# Patient Record
Sex: FEMALE | Race: BLACK OR AFRICAN AMERICAN | Employment: OTHER | URBAN - METROPOLITAN AREA
[De-identification: names, ages, dates, MRNs, and addresses within clinical notes are randomized per-mention and may not be internally consistent; named-entity substitution may affect disease eponyms.]

---

## 2021-03-09 ENCOUNTER — OFFICE VISIT (OUTPATIENT)
Dept: GASTROENTEROLOGY | Age: 66
End: 2021-03-09
Payer: MEDICARE

## 2021-03-09 VITALS
OXYGEN SATURATION: 98 % | DIASTOLIC BLOOD PRESSURE: 80 MMHG | BODY MASS INDEX: 30.96 KG/M2 | RESPIRATION RATE: 16 BRPM | SYSTOLIC BLOOD PRESSURE: 120 MMHG | HEART RATE: 56 BPM | HEIGHT: 61 IN | TEMPERATURE: 97.4 F | WEIGHT: 164 LBS

## 2021-03-09 DIAGNOSIS — R10.13 EPIGASTRIC PAIN: ICD-10-CM

## 2021-03-09 DIAGNOSIS — K57.92 DIVERTICULITIS: ICD-10-CM

## 2021-03-09 DIAGNOSIS — R10.32 LLQ ABDOMINAL PAIN: ICD-10-CM

## 2021-03-09 DIAGNOSIS — K57.30 DIVERTICULAR DISEASE OF COLON: ICD-10-CM

## 2021-03-09 DIAGNOSIS — Z87.19 HISTORY OF GASTRIC POLYP: Primary | ICD-10-CM

## 2021-03-09 PROBLEM — E55.9 VITAMIN D DEFICIENCY: Status: ACTIVE | Noted: 2021-03-09

## 2021-03-09 PROBLEM — M50.20 HERNIATED DISC, CERVICAL: Status: ACTIVE | Noted: 2021-03-09

## 2021-03-09 PROBLEM — I10 HTN (HYPERTENSION): Status: ACTIVE | Noted: 2021-03-09

## 2021-03-09 PROBLEM — M41.9 SCOLIOSIS: Status: ACTIVE | Noted: 2021-03-09

## 2021-03-09 PROCEDURE — 3017F COLORECTAL CA SCREEN DOC REV: CPT | Performed by: INTERNAL MEDICINE

## 2021-03-09 PROCEDURE — G8754 DIAS BP LESS 90: HCPCS | Performed by: INTERNAL MEDICINE

## 2021-03-09 PROCEDURE — G8427 DOCREV CUR MEDS BY ELIG CLIN: HCPCS | Performed by: INTERNAL MEDICINE

## 2021-03-09 PROCEDURE — 99204 OFFICE O/P NEW MOD 45 MIN: CPT | Performed by: INTERNAL MEDICINE

## 2021-03-09 PROCEDURE — G8510 SCR DEP NEG, NO PLAN REQD: HCPCS | Performed by: INTERNAL MEDICINE

## 2021-03-09 PROCEDURE — G8752 SYS BP LESS 140: HCPCS | Performed by: INTERNAL MEDICINE

## 2021-03-09 PROCEDURE — G8417 CALC BMI ABV UP PARAM F/U: HCPCS | Performed by: INTERNAL MEDICINE

## 2021-03-09 RX ORDER — METRONIDAZOLE 500 MG/1
TABLET ORAL
COMMUNITY
Start: 2021-02-19

## 2021-03-09 RX ORDER — CLINDAMYCIN HYDROCHLORIDE 150 MG/1
CAPSULE ORAL
COMMUNITY
Start: 2021-02-19

## 2021-03-09 RX ORDER — HYDROCHLOROTHIAZIDE 25 MG/1
TABLET ORAL
COMMUNITY
Start: 2021-02-22

## 2021-03-09 RX ORDER — AMLODIPINE BESYLATE 10 MG/1
TABLET ORAL
COMMUNITY
Start: 2021-02-22

## 2021-03-09 RX ORDER — NAPROXEN 375 MG/1
TABLET ORAL
COMMUNITY
Start: 2021-02-22

## 2021-03-09 RX ORDER — PANTOPRAZOLE SODIUM 40 MG/1
TABLET, DELAYED RELEASE ORAL
COMMUNITY
Start: 2021-02-27

## 2021-03-09 NOTE — PROGRESS NOTES
1. Have you been to the ER, urgent care clinic since your last visit? Hospitalized since your last visit? NO    2. Have you seen or consulted any other health care providers outside of the [address] [place] [address] since your last visit? Include any pap smears or colon screening.  NO.

## 2021-03-10 PROBLEM — Z87.19 HISTORY OF GASTRIC POLYP: Status: ACTIVE | Noted: 2021-03-10

## 2021-03-10 PROBLEM — R10.13 EPIGASTRIC PAIN: Status: ACTIVE | Noted: 2021-03-10

## 2021-03-13 NOTE — H&P (VIEW-ONLY)
Alok Correa is a 63 y.o. female who presents today for the following: Chief Complaint Patient presents with  GI Problem  
  diverticulitis-Referred by Dr Caro Vidales    ( 2 WEEKS AGO SHE SLIPPED ON ICE ND SCRAPPED R HAND)  GI Problem HX OF GASTRIC POLYP  
 GERD  
  EPIGASTRC ABD PAIN Allergies Allergen Reactions  Latex Unknown (comments)  Iodine Angioedema A LOT OF FACE AND TOUNGE SWELLING AFTER CT DYE.  Angiotensin Ii Unknown (comments)  Shellfish Derived Unknown (comments) Current Outpatient Medications Medication Sig  
 hydroCHLOROthiazide (HYDRODIURIL) 25 mg tablet TAKE 1 & 1 2 (ONE & ONE HALF) TABLETS BY MOUTH ONCE DAILY  amLODIPine (NORVASC) 10 mg tablet TAKE 1 TABLET BY MOUTH ONCE DAILY FOR 30 DAYS  pantoprazole (PROTONIX) 40 mg tablet TAKE 1 TABLET BY MOUTH ONCE DAILY FOR 30 DAYS  metroNIDAZOLE (FLAGYL) 500 mg tablet TAKE 1 TABLET BY MOUTH TWICE DAILY FOR 10 DAYS  clindamycin (CLEOCIN) 150 mg capsule TAKE 1 CAPSULE BY MOUTH 4 TIMES DAILY FOR 10 DAYS  naproxen (NAPROSYN) 375 mg tablet TAKE 1 TABLET BY MOUTH TWICE DAILY AS NEEDED FOR JOINT PAIN No current facility-administered medications for this visit. Past Medical History:  
Diagnosis Date  Abdominal pain, left lower quadrant  Diverticulitis 3/9/2021  Epigastric pain 3/10/2021  Herniated disc, cervical 3/9/2021  
 History of gastric polyp 3/10/2021  
 HTN (hypertension) 3/9/2021  Scoliosis 3/9/2021  Vitamin D deficiency 3/9/2021 Past Surgical History:  
Procedure Laterality Date  COLONOSCOPY  01/26/2016  ENDOSCOPY VISIT-OUTPATIENT  01/26/2016 POLYP IN STOMACH Family History Problem Relation Age of Onset  Hypertension Mother  Diabetes Mother  Cancer Mother  Lung Cancer Mother  Hypertension Father  Cancer Sister  Pancreatic Cancer Sister  Hypertension Brother  Heart Disease Other Social History Socioeconomic History  Marital status: SINGLE Spouse name: Not on file  Number of children: Not on file  Years of education: Not on file  Highest education level: Not on file Occupational History  Not on file Social Needs  Financial resource strain: Not on file  Food insecurity Worry: Not on file Inability: Not on file  Transportation needs Medical: Not on file Non-medical: Not on file Tobacco Use  Smoking status: Never Smoker  Smokeless tobacco: Never Used Substance and Sexual Activity  Alcohol use: Never Frequency: Never  Drug use: Never  Sexual activity: Not on file Lifestyle  Physical activity Days per week: Not on file Minutes per session: Not on file  Stress: Not on file Relationships  Social connections Talks on phone: Not on file Gets together: Not on file Attends Spiritism service: Not on file Active member of club or organization: Not on file Attends meetings of clubs or organizations: Not on file Relationship status: Not on file  Intimate partner violence Fear of current or ex partner: Not on file Emotionally abused: Not on file Physically abused: Not on file Forced sexual activity: Not on file Other Topics Concern  Not on file Social History Narrative  Not on file HPI 
19-year-old female with history of hypertension, vitamin D deficiency, herniated disks in cervical spine, scoliosis, diverticular disease, and gastric polyp who comes in for evaluation recent diverticulitis and reevaluation of the gastric polyp. Patient states she has had increased GI noise and pain in the left lower quadrant for approximately 3 months. She states 3 months ago the pain was severe and she was treated with a course of antibiotics. She states the pain is not as bad as before but is still present. Pain is especially bad whenever she defecates. No gross GI bleeding. Bowel movements are constipated but usually daily. She does take Activa which has helped. Her weight is up and down. Her last EGD and colonoscopy was in 2014 which showed antral gastritis, and gastric polyp in the body, decreased lower esophageal sphincter tone, and sigmoid diverticulosis. Patient states she was taking a lot of pain medications at that time. Burning in epigastric region and she is nauseated at times. Review of Systems Constitutional: Negative. HENT: Negative. Negative for nosebleeds. Eyes: Negative. Respiratory: Negative. Cardiovascular: Negative. Gastrointestinal: Positive for abdominal pain, constipation, heartburn and nausea. Negative for blood in stool, diarrhea, melena and vomiting. Genitourinary: Negative. Musculoskeletal: Negative. Skin: Negative. Neurological: Negative. Endo/Heme/Allergies: Negative. Psychiatric/Behavioral: Negative. All other systems reviewed and are negative. Visit Vitals /80 (BP 1 Location: Left upper arm, BP Patient Position: Sitting, BP Cuff Size: Large adult) Pulse (!) 56 Temp 97.4 °F (36.3 °C) (Temporal) Resp 16 Ht 5' 1\" (1.549 m) Wt 74.4 kg (164 lb) SpO2 98% Comment: room air BMI 30.99 kg/m² Physical Exam 
Vitals signs and nursing note reviewed. Constitutional:   
   General: She is not in acute distress. Appearance: Normal appearance. She is obese. She is not ill-appearing. HENT:  
   Head: Normocephalic and atraumatic. Nose: Nose normal.  
   Mouth/Throat:  
   Mouth: Mucous membranes are moist.  
   Pharynx: Oropharynx is clear. Eyes:  
   General: No scleral icterus. Conjunctiva/sclera: Conjunctivae normal.  
   Pupils: Pupils are equal, round, and reactive to light. Neck: Musculoskeletal: Normal range of motion and neck supple. Cardiovascular:  
   Rate and Rhythm: Normal rate and regular rhythm. Pulses: Normal pulses.   
   Heart sounds: Normal heart sounds. Pulmonary:  
   Effort: Pulmonary effort is normal.  
   Breath sounds: Normal breath sounds. Abdominal:  
   General: Bowel sounds are normal. There is no distension. Palpations: Abdomen is soft. There is no mass. Tenderness: There is abdominal tenderness. There is no right CVA tenderness, left CVA tenderness, guarding or rebound. Hernia: No hernia is present. Musculoskeletal: Normal range of motion. Skin: 
   General: Skin is warm and dry. Coloration: Skin is not jaundiced. Neurological:  
   General: No focal deficit present. Mental Status: She is alert and oriented to person, place, and time. Psychiatric:     
   Mood and Affect: Mood normal.     
   Behavior: Behavior normal.     
   Thought Content: Thought content normal.     
   Judgment: Judgment normal.  
 
  
 
 
1. History of gastric polyp Reevaluate the stomach where the gastric polyp was present. - UPPER GI ENDOSCOPY,DIAGNOSIS; Future 2. Diverticulitis 3. Diverticular disease of colon Present in the left lower quadrant 4. LLQ abdominal pain Because of the pain is likely the diverticular disease and acute diverticulitis as that was what was found last colonoscopy 5. Epigastric pain - UPPER GI ENDOSCOPY,DIAGNOSIS; Future

## 2021-03-13 NOTE — PROGRESS NOTES
Lizeth Briones is a 63 y.o. female who presents today for the following:  Chief Complaint   Patient presents with    GI Problem     diverticulitis-Referred by Dr Marilu Nassar    ( 2 WEEKS AGO SHE SLIPPED ON ICE ND SCRAPPED R HAND)    GI Problem     HX OF GASTRIC POLYP    GERD     EPIGASTRC ABD PAIN         Allergies   Allergen Reactions    Latex Unknown (comments)    Iodine Angioedema     A LOT OF FACE AND TOUNGE SWELLING AFTER CT DYE.  Angiotensin Ii Unknown (comments)    Shellfish Derived Unknown (comments)       Current Outpatient Medications   Medication Sig    hydroCHLOROthiazide (HYDRODIURIL) 25 mg tablet TAKE 1 & 1 2 (ONE & ONE HALF) TABLETS BY MOUTH ONCE DAILY    amLODIPine (NORVASC) 10 mg tablet TAKE 1 TABLET BY MOUTH ONCE DAILY FOR 30 DAYS    pantoprazole (PROTONIX) 40 mg tablet TAKE 1 TABLET BY MOUTH ONCE DAILY FOR 30 DAYS    metroNIDAZOLE (FLAGYL) 500 mg tablet TAKE 1 TABLET BY MOUTH TWICE DAILY FOR 10 DAYS    clindamycin (CLEOCIN) 150 mg capsule TAKE 1 CAPSULE BY MOUTH 4 TIMES DAILY FOR 10 DAYS    naproxen (NAPROSYN) 375 mg tablet TAKE 1 TABLET BY MOUTH TWICE DAILY AS NEEDED FOR JOINT PAIN     No current facility-administered medications for this visit.         Past Medical History:   Diagnosis Date    Abdominal pain, left lower quadrant     Diverticulitis 3/9/2021    Epigastric pain 3/10/2021    Herniated disc, cervical 3/9/2021    History of gastric polyp 3/10/2021    HTN (hypertension) 3/9/2021    Scoliosis 3/9/2021    Vitamin D deficiency 3/9/2021       Past Surgical History:   Procedure Laterality Date    COLONOSCOPY  01/26/2016    ENDOSCOPY VISIT-OUTPATIENT  01/26/2016    POLYP IN STOMACH       Family History   Problem Relation Age of Onset    Hypertension Mother     Diabetes Mother     Cancer Mother     Lung Cancer Mother     Hypertension Father     Cancer Sister     Pancreatic Cancer Sister     Hypertension Brother     Heart Disease Other        Social History Socioeconomic History    Marital status: SINGLE     Spouse name: Not on file    Number of children: Not on file    Years of education: Not on file    Highest education level: Not on file   Occupational History    Not on file   Social Needs    Financial resource strain: Not on file    Food insecurity     Worry: Not on file     Inability: Not on file    Transportation needs     Medical: Not on file     Non-medical: Not on file   Tobacco Use    Smoking status: Never Smoker    Smokeless tobacco: Never Used   Substance and Sexual Activity    Alcohol use: Never     Frequency: Never    Drug use: Never    Sexual activity: Not on file   Lifestyle    Physical activity     Days per week: Not on file     Minutes per session: Not on file    Stress: Not on file   Relationships    Social connections     Talks on phone: Not on file     Gets together: Not on file     Attends Mor[employer] service: Not on file     Active member of club or organization: Not on file     Attends meetings of clubs or organizations: Not on file     Relationship status: Not on file    Intimate partner violence     Fear of current or ex partner: Not on file     Emotionally abused: Not on file     Physically abused: Not on file     Forced sexual activity: Not on file   Other Topics Concern    Not on file   Social History Narrative    Not on file         HPI  29-year-old female with history of hypertension, vitamin D deficiency, herniated disks in cervical spine, scoliosis, diverticular disease, and gastric polyp who comes in for evaluation recent diverticulitis and reevaluation of the gastric polyp. Patient states she has had increased GI noise and pain in the left lower quadrant for approximately 3 months. She states 3 months ago the pain was severe and she was treated with a course of antibiotics. She states the pain is not as bad as before but is still present. Pain is especially bad whenever she defecates. No gross GI bleeding. Bowel movements are constipated but usually daily. She does take Activa which has helped. Her weight is up and down. Her last EGD and colonoscopy was in 2014 which showed antral gastritis, and gastric polyp in the body, decreased lower esophageal sphincter tone, and sigmoid diverticulosis. Patient states she was taking a lot of pain medications at that time. Burning in epigastric region and she is nauseated at times. Review of Systems   Constitutional: Negative. HENT: Negative. Negative for nosebleeds. Eyes: Negative. Respiratory: Negative. Cardiovascular: Negative. Gastrointestinal: Positive for abdominal pain, constipation, heartburn and nausea. Negative for blood in stool, diarrhea, melena and vomiting. Genitourinary: Negative. Musculoskeletal: Negative. Skin: Negative. Neurological: Negative. Endo/Heme/Allergies: Negative. Psychiatric/Behavioral: Negative. All other systems reviewed and are negative. Visit Vitals  /80 (BP 1 Location: Left upper arm, BP Patient Position: Sitting, BP Cuff Size: Large adult)   Pulse (!) 56   Temp 97.4 °F (36.3 °C) (Temporal)   Resp 16   Ht 5' 1\" (1.549 m)   Wt 74.4 kg (164 lb)   SpO2 98% Comment: room air   BMI 30.99 kg/m²     Physical Exam  Vitals signs and nursing note reviewed. Constitutional:       General: She is not in acute distress. Appearance: Normal appearance. She is obese. She is not ill-appearing. HENT:      Head: Normocephalic and atraumatic. Nose: Nose normal.      Mouth/Throat:      Mouth: Mucous membranes are moist.      Pharynx: Oropharynx is clear. Eyes:      General: No scleral icterus. Conjunctiva/sclera: Conjunctivae normal.      Pupils: Pupils are equal, round, and reactive to light. Neck:      Musculoskeletal: Normal range of motion and neck supple. Cardiovascular:      Rate and Rhythm: Normal rate and regular rhythm. Pulses: Normal pulses.       Heart sounds: Normal heart sounds. Pulmonary:      Effort: Pulmonary effort is normal.      Breath sounds: Normal breath sounds. Abdominal:      General: Bowel sounds are normal. There is no distension. Palpations: Abdomen is soft. There is no mass. Tenderness: There is abdominal tenderness. There is no right CVA tenderness, left CVA tenderness, guarding or rebound. Hernia: No hernia is present. Musculoskeletal: Normal range of motion. Skin:     General: Skin is warm and dry. Coloration: Skin is not jaundiced. Neurological:      General: No focal deficit present. Mental Status: She is alert and oriented to person, place, and time. Psychiatric:         Mood and Affect: Mood normal.         Behavior: Behavior normal.         Thought Content: Thought content normal.         Judgment: Judgment normal.            1. History of gastric polyp  Reevaluate the stomach where the gastric polyp was present. - UPPER GI ENDOSCOPY,DIAGNOSIS; Future    2. Diverticulitis      3. Diverticular disease of colon  Present in the left lower quadrant    4. LLQ abdominal pain  Because of the pain is likely the diverticular disease and acute diverticulitis as that was what was found last colonoscopy    5.  Epigastric pain    - UPPER GI ENDOSCOPY,DIAGNOSIS; Future

## 2021-03-18 ENCOUNTER — HOSPITAL ENCOUNTER (OUTPATIENT)
Dept: PREADMISSION TESTING | Age: 66
Discharge: HOME | End: 2021-03-18
Payer: MEDICARE

## 2021-03-18 PROCEDURE — U0005 INFEC AGEN DETEC AMPLI PROBE: HCPCS

## 2021-03-19 LAB — SARS-COV-2, COV2NT: NOT DETECTED

## 2021-03-23 ENCOUNTER — ANESTHESIA (OUTPATIENT)
Dept: ENDOSCOPY | Age: 66
End: 2021-03-23
Payer: MEDICARE

## 2021-03-23 ENCOUNTER — HOSPITAL ENCOUNTER (OUTPATIENT)
Age: 66
Setting detail: OUTPATIENT SURGERY
Discharge: HOME | End: 2021-03-23
Attending: INTERNAL MEDICINE | Admitting: INTERNAL MEDICINE
Payer: MEDICARE

## 2021-03-23 ENCOUNTER — ANESTHESIA EVENT (OUTPATIENT)
Dept: ENDOSCOPY | Age: 66
End: 2021-03-23
Payer: MEDICARE

## 2021-03-23 VITALS
HEIGHT: 61 IN | DIASTOLIC BLOOD PRESSURE: 79 MMHG | RESPIRATION RATE: 18 BRPM | HEART RATE: 55 BPM | TEMPERATURE: 98.1 F | WEIGHT: 162 LBS | BODY MASS INDEX: 30.58 KG/M2 | SYSTOLIC BLOOD PRESSURE: 135 MMHG | OXYGEN SATURATION: 100 %

## 2021-03-23 PROCEDURE — 76060000031 HC ANESTHESIA FIRST 0.5 HR: Performed by: INTERNAL MEDICINE

## 2021-03-23 PROCEDURE — 76040000019: Performed by: INTERNAL MEDICINE

## 2021-03-23 PROCEDURE — 43239 EGD BIOPSY SINGLE/MULTIPLE: CPT | Performed by: INTERNAL MEDICINE

## 2021-03-23 PROCEDURE — 74011250636 HC RX REV CODE- 250/636: Performed by: INTERNAL MEDICINE

## 2021-03-23 PROCEDURE — 74011250636 HC RX REV CODE- 250/636: Performed by: NURSE ANESTHETIST, CERTIFIED REGISTERED

## 2021-03-23 PROCEDURE — 77030021593 HC FCPS BIOP ENDOSC BSC -A: Performed by: INTERNAL MEDICINE

## 2021-03-23 PROCEDURE — 88305 TISSUE EXAM BY PATHOLOGIST: CPT

## 2021-03-23 PROCEDURE — 74011000250 HC RX REV CODE- 250: Performed by: NURSE ANESTHETIST, CERTIFIED REGISTERED

## 2021-03-23 PROCEDURE — 2709999900 HC NON-CHARGEABLE SUPPLY: Performed by: INTERNAL MEDICINE

## 2021-03-23 RX ORDER — PROPOFOL 10 MG/ML
INJECTION, EMULSION INTRAVENOUS AS NEEDED
Status: DISCONTINUED | OUTPATIENT
Start: 2021-03-23 | End: 2021-03-23 | Stop reason: HOSPADM

## 2021-03-23 RX ORDER — SODIUM CHLORIDE 9 MG/ML
25 INJECTION, SOLUTION INTRAVENOUS CONTINUOUS
Status: DISCONTINUED | OUTPATIENT
Start: 2021-03-23 | End: 2021-03-23 | Stop reason: HOSPADM

## 2021-03-23 RX ORDER — SODIUM CHLORIDE 0.9 % (FLUSH) 0.9 %
5-40 SYRINGE (ML) INJECTION EVERY 8 HOURS
Status: DISCONTINUED | OUTPATIENT
Start: 2021-03-23 | End: 2021-03-23 | Stop reason: HOSPADM

## 2021-03-23 RX ORDER — SODIUM CHLORIDE 0.9 % (FLUSH) 0.9 %
5-40 SYRINGE (ML) INJECTION AS NEEDED
Status: DISCONTINUED | OUTPATIENT
Start: 2021-03-23 | End: 2021-03-23 | Stop reason: HOSPADM

## 2021-03-23 RX ORDER — SODIUM CHLORIDE 9 MG/ML
125 INJECTION, SOLUTION INTRAVENOUS CONTINUOUS
Status: DISCONTINUED | OUTPATIENT
Start: 2021-03-23 | End: 2021-03-23 | Stop reason: HOSPADM

## 2021-03-23 RX ORDER — SODIUM CHLORIDE 9 MG/ML
INJECTION, SOLUTION INTRAVENOUS
Status: DISCONTINUED | OUTPATIENT
Start: 2021-03-23 | End: 2021-03-23 | Stop reason: HOSPADM

## 2021-03-23 RX ORDER — KETAMINE HYDROCHLORIDE 10 MG/ML
INJECTION, SOLUTION INTRAMUSCULAR; INTRAVENOUS AS NEEDED
Status: DISCONTINUED | OUTPATIENT
Start: 2021-03-23 | End: 2021-03-23 | Stop reason: HOSPADM

## 2021-03-23 RX ADMIN — PROPOFOL 20 MG: 10 INJECTION, EMULSION INTRAVENOUS at 09:49

## 2021-03-23 RX ADMIN — SODIUM CHLORIDE: 9 INJECTION, SOLUTION INTRAVENOUS at 09:37

## 2021-03-23 RX ADMIN — PROPOFOL 50 MG: 10 INJECTION, EMULSION INTRAVENOUS at 09:47

## 2021-03-23 RX ADMIN — Medication 10 MG: at 09:45

## 2021-03-23 RX ADMIN — PROPOFOL 40 MG: 10 INJECTION, EMULSION INTRAVENOUS at 09:51

## 2021-03-23 RX ADMIN — PROPOFOL 40 MG: 10 INJECTION, EMULSION INTRAVENOUS at 09:45

## 2021-03-23 RX ADMIN — SODIUM CHLORIDE 25 ML/HR: 9 INJECTION, SOLUTION INTRAVENOUS at 09:04

## 2021-03-23 NOTE — INTERVAL H&P NOTE
Update History & Physical 
 
The Patient's History and Physical of March 23, 2021  was reviewed with the patient and I examined the patient. There was no change. The surgical site was confirmed by the patient and me. Plan:  The risk, benefits, expected outcome, and alternative to the recommended procedure have been discussed with the patient. Patient understands and wants to proceed with the procedure.  
 
Electronically signed by Amaris Toledo MD on 3/23/2021 at 9:31 AM

## 2021-03-23 NOTE — DISCHARGE INSTRUCTIONS

## 2021-03-23 NOTE — OP NOTES
EGD Procedure Note        Patient: Ioana Ortiz MRN: [id number]  SSN: [id number]    YOB: 1955  Age: 63 y.o. Sex: female        Date/Time:  3/23/2021 10:03 AM         IMPRESSION:       1. Gastric polyps (body)  2. Antral gastritis  3. Distal esophagitis (grade 1)       RECOMMENDATIONS:    1. Check biopsy results. 2. Continue pantoprazole 40 mg daily    Procedure: Esophagogastroduodenoscopy with cold biopsy    Indication: History of gastric polyp, epigastric pain    Endoscopist:  Brandin Vallejo MD    Referring Provider:   Irene Flores MD    History: The history and physical exam were reviewed and updated. Endoscope: GIF H190 Olympus video endoscope    Extent of Exam: Second part of the duodenum    ASA: Grade 2    Anethesia/Sedation:  TIVA    Description of the procedure: The procedure was discussed with the patient including risks, benefits, alternatives including risks of iv sedation, bleeding, perforation and aspiration. A safety timeout was performed. The patient was placed in the left lateral decubitus position. A bite block was placed. The patient was using standard protocol. The patients vital signs were monitored at all times including heart rate/rhythm, blood pressure and oxygen saturation. The endoscope was then passed under direct visualization to the second part of the duodenum. The endoscope was then slowly withdrawn while visualizing the mucosa. In the stomach a retroflexion was performed and gastric fundus and cardia visualized. The patient was then transferred to recovery in stable condition. Findings:   Esophagus: The esophageal mucosa was mildly inflamed and irregular at the Z-line consistent with a grade 1 esophagitis. Stomach: The gastric mucosa was increased inflammation in the gastric antrum. A gastric polyp was noted in the body of the stomach which measured approximately 1 cm erythematous tube.   Numerous biopsies were taken of this lesion. Duodenum: The duodenum mucosa was normal with no ulceration, mass, stricture and no evidence of villous atrophy. Therapies:  None    Specimens:   ID Type Source Tests Collected by Time Destination   1 : gastric polyp Preservative Gastric  Lebronorlando Perez MD 3/23/2021 6856 Pathology              EBL: Minimal    Complications:   None; patient tolerated the procedure well.      Implants: None    Discharge disposition:  Out of the recovery area when discharge criteria met         Rebekah Rodgers MD  March 23, 2021  10:03 AM

## 2021-03-23 NOTE — ANESTHESIA POSTPROCEDURE EVALUATION
Procedure(s):  ESOPHAGOGASTRODUODENOSCOPY (EGD) (TIVA). total IV anesthesia, MAC, general - backup    Anesthesia Post Evaluation      Multimodal analgesia: multimodal analgesia used between 6 hours prior to anesthesia start to PACU discharge  Patient location during evaluation: bedside  Patient participation: complete - patient participated  Level of consciousness: awake and alert  Pain score: 0  Pain management: satisfactory to patient  Airway patency: patent  Anesthetic complications: no  Cardiovascular status: acceptable  Respiratory status: acceptable  Hydration status: acceptable  Comments: Patient tolerated procedure well without complication.   Post anesthesia nausea and vomiting:  none  Final Post Anesthesia Temperature Assessment:  Normothermia (36.0-37.5 degrees C)      INITIAL Post-op Vital signs:   Vitals Value Taken Time   /75 03/23/21 0959   Temp 36.7 °C (98.1 °F) 03/23/21 0959   Pulse 63 03/23/21 0959   Resp 16 03/23/21 0959   SpO2 100 % 03/23/21 0959

## 2021-03-23 NOTE — ANESTHESIA PREPROCEDURE EVALUATION
Relevant Problems   CARDIOVASCULAR   (+) HTN (hypertension)       Anesthetic History   No history of anesthetic complications            Review of Systems / Medical History  Patient summary reviewed, nursing notes reviewed and pertinent labs reviewed    Pulmonary  Within defined limits                 Neuro/Psych   Within defined limits           Cardiovascular    Hypertension: well controlled              Exercise tolerance: >4 METS  Comments: Patient reports hx of heart murmur, none identified on auscultation, patient reports no issues with activity.    GI/Hepatic/Renal               Comments: Diverticulitis, HX of Gastric Polyp Endo/Other             Other Findings   Comments: Scoliosis, Cervical Disc Herniation,         Physical Exam    Airway  Mallampati: II  TM Distance: < 4 cm  Neck ROM: decreased range of motion   Mouth opening: Normal     Cardiovascular    Rhythm: regular  Rate: normal         Dental  No notable dental hx       Pulmonary  Breath sounds clear to auscultation               Abdominal  GI exam deferred       Other Findings            Anesthetic Plan    ASA: 2  Anesthesia type: total IV anesthesia, MAC and general - backup    Monitoring Plan: Continuous noninvasive hemodynamic monitoring      Induction: Intravenous  Anesthetic plan and risks discussed with: Patient

## 2021-03-25 NOTE — PROGRESS NOTES
Tell patient that the biopsies of the polyp in his stomach showed hyperplastic changes which were benign. She should have a repeat EGD in 3 years. She should continue the pantoprazole 40 mg daily.

## 2021-04-03 ENCOUNTER — TELEPHONE (OUTPATIENT)
Dept: GASTROENTEROLOGY | Age: 66
End: 2021-04-03

## 2021-04-03 NOTE — TELEPHONE ENCOUNTER
----- Message from Radha Martinez MD sent at 3/25/2021  1:19 PM EDT -----  Tell patient that the biopsies of the polyp in his stomach showed hyperplastic changes which were benign. She should have a repeat EGD in 3 years. She should continue the pantoprazole 40 mg daily.

## 2021-04-05 ENCOUNTER — TELEPHONE (OUTPATIENT)
Dept: GASTROENTEROLOGY | Age: 66
End: 2021-04-05

## 2021-04-05 NOTE — TELEPHONE ENCOUNTER
Patient came by office, I explained her test showed a hyperplastic gastric polyp. Will repeat EGD in 3 years. Colonoscopy can be repeated between 2021 and 2026. Continue pantoprazole. She said ok. She also let us know her mom just passed.

## 2021-04-05 NOTE — TELEPHONE ENCOUNTER
----- Message from Ella Us MD sent at 3/25/2021  1:19 PM EDT -----  Tell patient that the biopsies of the polyp in his stomach showed hyperplastic changes which were benign. She should have a repeat EGD in 3 years. She should continue the pantoprazole 40 mg daily.

## 2022-03-18 PROBLEM — Z87.19 HISTORY OF GASTRIC POLYP: Status: ACTIVE | Noted: 2021-03-10

## 2022-03-18 PROBLEM — K57.92 DIVERTICULITIS: Status: ACTIVE | Noted: 2021-03-09

## 2022-03-19 PROBLEM — I10 HTN (HYPERTENSION): Status: ACTIVE | Noted: 2021-03-09

## 2022-03-19 PROBLEM — M41.9 SCOLIOSIS: Status: ACTIVE | Noted: 2021-03-09

## 2022-03-19 PROBLEM — E55.9 VITAMIN D DEFICIENCY: Status: ACTIVE | Noted: 2021-03-09

## 2022-03-19 PROBLEM — R10.13 EPIGASTRIC PAIN: Status: ACTIVE | Noted: 2021-03-10

## 2022-03-19 PROBLEM — M50.20 HERNIATED DISC, CERVICAL: Status: ACTIVE | Noted: 2021-03-09

## 2023-01-17 ENCOUNTER — OFFICE VISIT (OUTPATIENT)
Dept: GASTROENTEROLOGY | Age: 68
End: 2023-01-17
Payer: MEDICARE

## 2023-01-17 VITALS
HEIGHT: 61 IN | WEIGHT: 165.8 LBS | TEMPERATURE: 98.4 F | SYSTOLIC BLOOD PRESSURE: 130 MMHG | HEART RATE: 54 BPM | BODY MASS INDEX: 31.3 KG/M2 | OXYGEN SATURATION: 98 % | DIASTOLIC BLOOD PRESSURE: 80 MMHG | RESPIRATION RATE: 14 BRPM

## 2023-01-17 DIAGNOSIS — R10.13 EPIGASTRIC PAIN: ICD-10-CM

## 2023-01-17 DIAGNOSIS — K21.00 GASTROESOPHAGEAL REFLUX DISEASE WITH ESOPHAGITIS WITHOUT HEMORRHAGE: ICD-10-CM

## 2023-01-17 DIAGNOSIS — K57.30 DIVERTICULAR DISEASE OF COLON: ICD-10-CM

## 2023-01-17 DIAGNOSIS — R10.32 LLQ ABDOMINAL PAIN: ICD-10-CM

## 2023-01-17 DIAGNOSIS — K31.7 GASTRIC POLYPS: Primary | ICD-10-CM

## 2023-01-17 DIAGNOSIS — Z87.19 HISTORY OF GASTRIC POLYP: ICD-10-CM

## 2023-01-17 RX ORDER — AMOXICILLIN 250 MG/1
250 CAPSULE ORAL 3 TIMES DAILY
COMMUNITY
Start: 2022-10-18

## 2023-01-17 RX ORDER — PRAVASTATIN SODIUM 20 MG/1
TABLET ORAL
COMMUNITY
Start: 2022-12-01

## 2023-01-17 RX ORDER — POLYETHYLENE GLYCOL 3350 17 G/17G
POWDER, FOR SOLUTION ORAL
Qty: 510 G | Refills: 0 | Status: SHIPPED | OUTPATIENT
Start: 2023-01-17

## 2023-01-17 RX ORDER — PANTOPRAZOLE SODIUM 40 MG/1
40 TABLET, DELAYED RELEASE ORAL DAILY
Qty: 30 TABLET | Refills: 6 | Status: SHIPPED | OUTPATIENT
Start: 2023-01-17

## 2023-01-17 NOTE — PROGRESS NOTES
1. Have you been to the ER, urgent care clinic since your last visit? Hospitalized since your last visit? no    2. Have you seen or consulted any other health care providers outside of the [address] [place] [address] since your last visit? Include any pap smears or colon screening. No   Chief Complaint   Patient presents with    Follow-up     Follow up OV 3-9-2021    Hx of Gastric Polyp, diverticular disease.     Abdominal Pain     Difficult to move bowels , see CT RESULTS     Visit Vitals  /80 (BP 1 Location: Left upper arm, BP Patient Position: Sitting, BP Cuff Size: Adult)   Pulse (!) 54   Temp 98.4 °F (36.9 °C) (Temporal)   Resp 14   Ht 5' 1\" (1.549 m)   Wt 75.2 kg (165 lb 12.8 oz)   SpO2 98%   BMI 31.33 kg/m²

## 2023-01-22 NOTE — H&P (VIEW-ONLY)
Gertrude Glass is a 74 y.o. female who presents today for the following:  Chief Complaint   Patient presents with    Follow-up     Follow up OV 3-9-2021    Hx of Gastric Polyp, diverticular disease. Abdominal Pain     Difficult to move bowels , see CT RESULTS         Allergies   Allergen Reactions    Latex Unknown (comments)    Iodine Angioedema     A LOT OF FACE AND TOUNGE SWELLING AFTER CT DYE. Angiotensin Ii Unknown (comments)    Shellfish Derived Unknown (comments)       Current Outpatient Medications   Medication Sig    pantoprazole (PROTONIX) 40 mg tablet Take 1 Tablet by mouth daily. polyethylene glycol (MIRALAX) 17 gram/dose powder Use as directed  Indications: emptying of the bowel    amoxicillin (AMOXIL) 250 mg capsule Take 250 mg by mouth three (3) times daily. hydroCHLOROthiazide (HYDRODIURIL) 25 mg tablet TAKE 1 & 1 2 (ONE & ONE HALF) TABLETS BY MOUTH ONCE DAILY    amLODIPine (NORVASC) 10 mg tablet TAKE 1 TABLET BY MOUTH ONCE DAILY FOR 30 DAYS    clindamycin (CLEOCIN) 150 mg capsule TAKE 1 CAPSULE BY MOUTH 4 TIMES DAILY FOR 10 DAYS    pravastatin (PRAVACHOL) 20 mg tablet TAKE 1 TABLET BY MOUTH EVERY DAY AT BEDTIME NIGHTLY    metroNIDAZOLE (FLAGYL) 500 mg tablet TAKE 1 TABLET BY MOUTH TWICE DAILY FOR 10 DAYS (Patient not taking: Reported on 1/17/2023)    naproxen (NAPROSYN) 375 mg tablet TAKE 1 TABLET BY MOUTH TWICE DAILY AS NEEDED FOR JOINT PAIN (Patient not taking: Reported on 1/17/2023)     No current facility-administered medications for this visit.        Past Medical History:   Diagnosis Date    Abdominal pain, left lower quadrant     Diverticulitis 3/9/2021    Epigastric pain 3/10/2021    Herniated disc, cervical 3/9/2021    History of gastric polyp 3/10/2021    HTN (hypertension) 3/9/2021    Scoliosis 3/9/2021    Vitamin D deficiency 3/9/2021       Past Surgical History:   Procedure Laterality Date    COLONOSCOPY  01/26/2016    ENDOSCOPY VISIT-OUTPATIENT  01/26/2016    POLYP IN STOMACH HX ENDOSCOPY N/A 03/23/2021    EGD       Family History   Problem Relation Age of Onset    Hypertension Mother     Diabetes Mother     Cancer Mother     Lung Cancer Mother     Hypertension Father     Cancer Sister     Pancreatic Cancer Sister     Hypertension Brother     Heart Disease Other        Social History     Socioeconomic History    Marital status: SINGLE     Spouse name: Not on file    Number of children: Not on file    Years of education: Not on file    Highest education level: Not on file   Occupational History    Not on file   Tobacco Use    Smoking status: Never    Smokeless tobacco: Never   Vaping Use    Vaping Use: Never used   Substance and Sexual Activity    Alcohol use: Never    Drug use: Never    Sexual activity: Not on file   Other Topics Concern    Not on file   Social History Narrative    Not on file     Social Determinants of Health     Financial Resource Strain: Not on file   Food Insecurity: Not on file   Transportation Needs: Not on file   Physical Activity: Not on file   Stress: Not on file   Social Connections: Not on file   Intimate Partner Violence: Not on file   Housing Stability: Not on file         HPI  72-year-old female with history of hypertension, vitamin D deficiency, herniated disks in the cervical spine, diverticular disease, and gastric polyp who comes in for follow-up visit for the gastric polyp history and abdominal pain. States she is a a lot better now with her abdominal pain. She states it was severe for 2 months ago with pain in the left lower quadrant. She was treated GERD of antibiotic therapy x2 with resolution of the pain. She was also started on high-fiber diet with help. She now has a bowel movement daily. She does use a tibia and eats a banana every morning. She does still take naproxen as needed. She had an EGD on 3/23/2021 which showed a gastric polyp in the body of the stomach, antral gastritis, and distal esophagitis.     Review of Systems Constitutional: Negative. HENT: Negative. Eyes: Negative. Respiratory: Negative. Cardiovascular: Negative. Gastrointestinal:  Positive for abdominal pain. Negative for blood in stool, constipation, diarrhea, heartburn, melena, nausea and vomiting. Genitourinary: Negative. Musculoskeletal:  Positive for joint pain. Skin: Negative. Neurological: Negative. Endo/Heme/Allergies: Negative. Psychiatric/Behavioral: Negative. All other systems reviewed and are negative. Visit Vitals  /80 (BP 1 Location: Left upper arm, BP Patient Position: Sitting, BP Cuff Size: Adult)   Pulse (!) 54   Temp 98.4 °F (36.9 °C) (Temporal)   Resp 14   Ht 5' 1\" (1.549 m)   Wt 75.2 kg (165 lb 12.8 oz)   SpO2 98%   BMI 31.33 kg/m²     Physical Exam  Vitals and nursing note reviewed. Constitutional:       Appearance: Normal appearance. She is obese. HENT:      Head: Normocephalic and atraumatic. Nose: Nose normal.      Mouth/Throat:      Mouth: Mucous membranes are moist.      Pharynx: Oropharynx is clear. Eyes:      General: No scleral icterus. Conjunctiva/sclera: Conjunctivae normal.      Pupils: Pupils are equal, round, and reactive to light. Cardiovascular:      Rate and Rhythm: Normal rate and regular rhythm. Pulses: Normal pulses. Heart sounds: Normal heart sounds. Pulmonary:      Effort: Pulmonary effort is normal.      Breath sounds: Normal breath sounds. Abdominal:      General: Bowel sounds are normal. There is no distension. Palpations: Abdomen is soft. There is no mass. Tenderness: There is no abdominal tenderness. There is no right CVA tenderness, left CVA tenderness, guarding or rebound. Hernia: No hernia is present. Musculoskeletal:         General: Normal range of motion. Cervical back: Normal range of motion and neck supple. Skin:     General: Skin is warm and dry. Coloration: Skin is not jaundiced.    Neurological: General: No focal deficit present. Mental Status: She is alert and oriented to person, place, and time. Psychiatric:         Mood and Affect: Mood normal.         Behavior: Behavior normal.         Thought Content: Thought content normal.         Judgment: Judgment normal.          1. Gastric polyps  Will schedule for a surveillance EGD  - UPPER GI ENDOSCOPY,DIAGNOSIS; Future    2. Gastroesophageal reflux disease with esophagitis without hemorrhage  Will start on pantoprazole 40 mg daily. - pantoprazole (PROTONIX) 40 mg tablet; Take 1 Tablet by mouth daily. Dispense: 30 Tablet; Refill: 6  - UPPER GI ENDOSCOPY,DIAGNOSIS; Future    3. LLQ abdominal pain  Probably secondary to diverticular disease.  - COLONOSCOPY,DIAGNOSTIC; Future  - polyethylene glycol (MIRALAX) 17 gram/dose powder; Use as directed  Indications: emptying of the bowel  Dispense: 510 g; Refill: 0    4. History of gastric polyp      5. Epigastric pain      6.  Diverticular disease of colon

## 2023-01-22 NOTE — PROGRESS NOTES
Latesha Palomino is a 74 y.o. female who presents today for the following:  Chief Complaint   Patient presents with    Follow-up     Follow up OV 3-9-2021    Hx of Gastric Polyp, diverticular disease. Abdominal Pain     Difficult to move bowels , see CT RESULTS         Allergies   Allergen Reactions    Latex Unknown (comments)    Iodine Angioedema     A LOT OF FACE AND TOUNGE SWELLING AFTER CT DYE. Angiotensin Ii Unknown (comments)    Shellfish Derived Unknown (comments)       Current Outpatient Medications   Medication Sig    pantoprazole (PROTONIX) 40 mg tablet Take 1 Tablet by mouth daily. polyethylene glycol (MIRALAX) 17 gram/dose powder Use as directed  Indications: emptying of the bowel    amoxicillin (AMOXIL) 250 mg capsule Take 250 mg by mouth three (3) times daily. hydroCHLOROthiazide (HYDRODIURIL) 25 mg tablet TAKE 1 & 1 2 (ONE & ONE HALF) TABLETS BY MOUTH ONCE DAILY    amLODIPine (NORVASC) 10 mg tablet TAKE 1 TABLET BY MOUTH ONCE DAILY FOR 30 DAYS    clindamycin (CLEOCIN) 150 mg capsule TAKE 1 CAPSULE BY MOUTH 4 TIMES DAILY FOR 10 DAYS    pravastatin (PRAVACHOL) 20 mg tablet TAKE 1 TABLET BY MOUTH EVERY DAY AT BEDTIME NIGHTLY    metroNIDAZOLE (FLAGYL) 500 mg tablet TAKE 1 TABLET BY MOUTH TWICE DAILY FOR 10 DAYS (Patient not taking: Reported on 1/17/2023)    naproxen (NAPROSYN) 375 mg tablet TAKE 1 TABLET BY MOUTH TWICE DAILY AS NEEDED FOR JOINT PAIN (Patient not taking: Reported on 1/17/2023)     No current facility-administered medications for this visit.        Past Medical History:   Diagnosis Date    Abdominal pain, left lower quadrant     Diverticulitis 3/9/2021    Epigastric pain 3/10/2021    Herniated disc, cervical 3/9/2021    History of gastric polyp 3/10/2021    HTN (hypertension) 3/9/2021    Scoliosis 3/9/2021    Vitamin D deficiency 3/9/2021       Past Surgical History:   Procedure Laterality Date    COLONOSCOPY  01/26/2016    ENDOSCOPY VISIT-OUTPATIENT  01/26/2016    POLYP IN STOMACH HX ENDOSCOPY N/A 03/23/2021    EGD       Family History   Problem Relation Age of Onset    Hypertension Mother     Diabetes Mother     Cancer Mother     Lung Cancer Mother     Hypertension Father     Cancer Sister     Pancreatic Cancer Sister     Hypertension Brother     Heart Disease Other        Social History     Socioeconomic History    Marital status: SINGLE     Spouse name: Not on file    Number of children: Not on file    Years of education: Not on file    Highest education level: Not on file   Occupational History    Not on file   Tobacco Use    Smoking status: Never    Smokeless tobacco: Never   Vaping Use    Vaping Use: Never used   Substance and Sexual Activity    Alcohol use: Never    Drug use: Never    Sexual activity: Not on file   Other Topics Concern    Not on file   Social History Narrative    Not on file     Social Determinants of Health     Financial Resource Strain: Not on file   Food Insecurity: Not on file   Transportation Needs: Not on file   Physical Activity: Not on file   Stress: Not on file   Social Connections: Not on file   Intimate Partner Violence: Not on file   Housing Stability: Not on file         HPI  44-year-old female with history of hypertension, vitamin D deficiency, herniated disks in the cervical spine, diverticular disease, and gastric polyp who comes in for follow-up visit for the gastric polyp history and abdominal pain. States she is a a lot better now with her abdominal pain. She states it was severe for 2 months ago with pain in the left lower quadrant. She was treated GERD of antibiotic therapy x2 with resolution of the pain. She was also started on high-fiber diet with help. She now has a bowel movement daily. She does use a tibia and eats a banana every morning. She does still take naproxen as needed. She had an EGD on 3/23/2021 which showed a gastric polyp in the body of the stomach, antral gastritis, and distal esophagitis.     Review of Systems Constitutional: Negative. HENT: Negative. Eyes: Negative. Respiratory: Negative. Cardiovascular: Negative. Gastrointestinal:  Positive for abdominal pain. Negative for blood in stool, constipation, diarrhea, heartburn, melena, nausea and vomiting. Genitourinary: Negative. Musculoskeletal:  Positive for joint pain. Skin: Negative. Neurological: Negative. Endo/Heme/Allergies: Negative. Psychiatric/Behavioral: Negative. All other systems reviewed and are negative. Visit Vitals  /80 (BP 1 Location: Left upper arm, BP Patient Position: Sitting, BP Cuff Size: Adult)   Pulse (!) 54   Temp 98.4 °F (36.9 °C) (Temporal)   Resp 14   Ht 5' 1\" (1.549 m)   Wt 75.2 kg (165 lb 12.8 oz)   SpO2 98%   BMI 31.33 kg/m²     Physical Exam  Vitals and nursing note reviewed. Constitutional:       Appearance: Normal appearance. She is obese. HENT:      Head: Normocephalic and atraumatic. Nose: Nose normal.      Mouth/Throat:      Mouth: Mucous membranes are moist.      Pharynx: Oropharynx is clear. Eyes:      General: No scleral icterus. Conjunctiva/sclera: Conjunctivae normal.      Pupils: Pupils are equal, round, and reactive to light. Cardiovascular:      Rate and Rhythm: Normal rate and regular rhythm. Pulses: Normal pulses. Heart sounds: Normal heart sounds. Pulmonary:      Effort: Pulmonary effort is normal.      Breath sounds: Normal breath sounds. Abdominal:      General: Bowel sounds are normal. There is no distension. Palpations: Abdomen is soft. There is no mass. Tenderness: There is no abdominal tenderness. There is no right CVA tenderness, left CVA tenderness, guarding or rebound. Hernia: No hernia is present. Musculoskeletal:         General: Normal range of motion. Cervical back: Normal range of motion and neck supple. Skin:     General: Skin is warm and dry. Coloration: Skin is not jaundiced.    Neurological: General: No focal deficit present. Mental Status: She is alert and oriented to person, place, and time. Psychiatric:         Mood and Affect: Mood normal.         Behavior: Behavior normal.         Thought Content: Thought content normal.         Judgment: Judgment normal.          1. Gastric polyps  Will schedule for a surveillance EGD  - UPPER GI ENDOSCOPY,DIAGNOSIS; Future    2. Gastroesophageal reflux disease with esophagitis without hemorrhage  Will start on pantoprazole 40 mg daily. - pantoprazole (PROTONIX) 40 mg tablet; Take 1 Tablet by mouth daily. Dispense: 30 Tablet; Refill: 6  - UPPER GI ENDOSCOPY,DIAGNOSIS; Future    3. LLQ abdominal pain  Probably secondary to diverticular disease.  - COLONOSCOPY,DIAGNOSTIC; Future  - polyethylene glycol (MIRALAX) 17 gram/dose powder; Use as directed  Indications: emptying of the bowel  Dispense: 510 g; Refill: 0    4. History of gastric polyp      5. Epigastric pain      6.  Diverticular disease of colon

## 2023-02-15 ENCOUNTER — ANESTHESIA EVENT (OUTPATIENT)
Dept: ENDOSCOPY | Age: 68
End: 2023-02-15
Payer: MEDICARE

## 2023-02-15 ENCOUNTER — ANESTHESIA (OUTPATIENT)
Dept: ENDOSCOPY | Age: 68
End: 2023-02-15
Payer: MEDICARE

## 2023-02-15 ENCOUNTER — HOSPITAL ENCOUNTER (OUTPATIENT)
Age: 68
Setting detail: OUTPATIENT SURGERY
Discharge: HOME | End: 2023-02-15
Attending: INTERNAL MEDICINE | Admitting: INTERNAL MEDICINE
Payer: MEDICARE

## 2023-02-15 VITALS
HEART RATE: 81 BPM | RESPIRATION RATE: 18 BRPM | HEIGHT: 61 IN | BODY MASS INDEX: 30.21 KG/M2 | SYSTOLIC BLOOD PRESSURE: 131 MMHG | WEIGHT: 160 LBS | OXYGEN SATURATION: 99 % | DIASTOLIC BLOOD PRESSURE: 69 MMHG | TEMPERATURE: 97.8 F

## 2023-02-15 PROCEDURE — 88305 TISSUE EXAM BY PATHOLOGIST: CPT

## 2023-02-15 PROCEDURE — 45378 DIAGNOSTIC COLONOSCOPY: CPT | Performed by: INTERNAL MEDICINE

## 2023-02-15 PROCEDURE — 43239 EGD BIOPSY SINGLE/MULTIPLE: CPT | Performed by: INTERNAL MEDICINE

## 2023-02-15 PROCEDURE — 2709999900 HC NON-CHARGEABLE SUPPLY: Performed by: INTERNAL MEDICINE

## 2023-02-15 PROCEDURE — 76040000007: Performed by: INTERNAL MEDICINE

## 2023-02-15 PROCEDURE — 74011250636 HC RX REV CODE- 250/636: Performed by: NURSE ANESTHETIST, CERTIFIED REGISTERED

## 2023-02-15 PROCEDURE — 77030021593 HC FCPS BIOP ENDOSC BSC -A: Performed by: INTERNAL MEDICINE

## 2023-02-15 PROCEDURE — 76060000032 HC ANESTHESIA 0.5 TO 1 HR: Performed by: INTERNAL MEDICINE

## 2023-02-15 PROCEDURE — 74011250636 HC RX REV CODE- 250/636

## 2023-02-15 PROCEDURE — 74011250636 HC RX REV CODE- 250/636: Performed by: INTERNAL MEDICINE

## 2023-02-15 PROCEDURE — 74011000250 HC RX REV CODE- 250: Performed by: NURSE ANESTHETIST, CERTIFIED REGISTERED

## 2023-02-15 PROCEDURE — 74011000250 HC RX REV CODE- 250

## 2023-02-15 RX ORDER — SODIUM CHLORIDE 0.9 % (FLUSH) 0.9 %
5-40 SYRINGE (ML) INJECTION EVERY 8 HOURS
Status: DISCONTINUED | OUTPATIENT
Start: 2023-02-15 | End: 2023-02-15 | Stop reason: HOSPADM

## 2023-02-15 RX ORDER — SODIUM CHLORIDE 9 MG/ML
INJECTION, SOLUTION INTRAVENOUS
Status: DISCONTINUED | OUTPATIENT
Start: 2023-02-15 | End: 2023-02-15 | Stop reason: HOSPADM

## 2023-02-15 RX ORDER — GLYCOPYRROLATE 0.2 MG/ML
INJECTION INTRAMUSCULAR; INTRAVENOUS AS NEEDED
Status: DISCONTINUED | OUTPATIENT
Start: 2023-02-15 | End: 2023-02-15 | Stop reason: HOSPADM

## 2023-02-15 RX ORDER — SODIUM CHLORIDE 9 MG/ML
125 INJECTION, SOLUTION INTRAVENOUS CONTINUOUS
Status: DISCONTINUED | OUTPATIENT
Start: 2023-02-15 | End: 2023-02-15 | Stop reason: HOSPADM

## 2023-02-15 RX ORDER — SODIUM CHLORIDE 0.9 % (FLUSH) 0.9 %
5-40 SYRINGE (ML) INJECTION AS NEEDED
Status: DISCONTINUED | OUTPATIENT
Start: 2023-02-15 | End: 2023-02-15 | Stop reason: HOSPADM

## 2023-02-15 RX ORDER — PROPOFOL 10 MG/ML
INJECTION, EMULSION INTRAVENOUS AS NEEDED
Status: DISCONTINUED | OUTPATIENT
Start: 2023-02-15 | End: 2023-02-15 | Stop reason: HOSPADM

## 2023-02-15 RX ORDER — SODIUM CHLORIDE 9 MG/ML
25 INJECTION, SOLUTION INTRAVENOUS CONTINUOUS
Status: DISCONTINUED | OUTPATIENT
Start: 2023-02-15 | End: 2023-02-15 | Stop reason: HOSPADM

## 2023-02-15 RX ORDER — LIDOCAINE HYDROCHLORIDE 20 MG/ML
INJECTION, SOLUTION EPIDURAL; INFILTRATION; INTRACAUDAL; PERINEURAL AS NEEDED
Status: DISCONTINUED | OUTPATIENT
Start: 2023-02-15 | End: 2023-02-15 | Stop reason: HOSPADM

## 2023-02-15 RX ADMIN — TOPICAL ANESTHETIC 1 SPRAY: 200 SPRAY DENTAL; PERIODONTAL at 09:43

## 2023-02-15 RX ADMIN — PROPOFOL 100 MG: 10 INJECTION, EMULSION INTRAVENOUS at 10:04

## 2023-02-15 RX ADMIN — SODIUM CHLORIDE: 9 INJECTION, SOLUTION INTRAVENOUS at 09:44

## 2023-02-15 RX ADMIN — SODIUM CHLORIDE: 9 INJECTION, SOLUTION INTRAVENOUS at 09:04

## 2023-02-15 RX ADMIN — SODIUM CHLORIDE 25 ML/HR: 9 INJECTION, SOLUTION INTRAVENOUS at 08:20

## 2023-02-15 RX ADMIN — PROPOFOL 100 MG: 10 INJECTION, EMULSION INTRAVENOUS at 09:56

## 2023-02-15 RX ADMIN — GLYCOPYRROLATE 0.2 MG: 0.2 INJECTION, SOLUTION INTRAMUSCULAR; INTRAVENOUS at 09:44

## 2023-02-15 RX ADMIN — PROPOFOL 50 MG: 10 INJECTION, EMULSION INTRAVENOUS at 10:00

## 2023-02-15 RX ADMIN — LIDOCAINE HYDROCHLORIDE 60 MG: 20 INJECTION, SOLUTION EPIDURAL; INFILTRATION; INTRACAUDAL; PERINEURAL at 09:44

## 2023-02-15 NOTE — DISCHARGE INSTRUCTIONS

## 2023-02-15 NOTE — OP NOTES
Colonoscopy Procedure Note      Patient: Juan Antonio Osman MRN: [id number]  SSN: [id number]    YOB: 1955  Age: 74 y.o. Sex: female      Date of Procedure: 2/15/2023  Date/Time:  2/15/2023 10:25 AM       IMPRESSION:     1. Sigmoid diverticulosis         RECOMMENDATIONS:     1) Increase fiber in diet to keep stools soft. 2) Repeat colonoscopy in 10 years. INDICATION: Lower quadrant abdominal pain    PROCEDURE PERFORMED: Colonoscopy      DESCRIPTION OF PROCEDURE: An informed consent was obtained. The patient was placed in left lateral position. Perianal inspection and a digital rectal exam was performed. Video colonoscope was introduced into the rectum and advanced under direct vision up to the terminal ileum. With adequate insufflation and maneuvering of the withdrawing scope, the colonic mucosa was visualized carefully. Retroflexion was performed in the rectum to see the anorectum and also in the ascending colon to look behind the folds. Vital signs, pulse oximetry, single lead cardiac monitor were monitored throughout the procedure as the sedation was titrated to the desired effect ensuring patient comfort and safety. The patient tolerated the procedure very well and was transferred to the recovery area. Following is the summary of findings: There were few scattered diverticula throughout the sigmoid colon. No other mucosal lesions were noted to the level of the cecum.         ENDOSCOPIST: Asha Juarez MD     ENDOSCOPE: Olympus videocolonoscope     ASSISTANT:Circ-1: Maryana Loop             Scrub Tech-1: Main QUILES     ANESTHESIA: TIVA      QUALITY OF PREPARATION:good      FINDINGS:   Sigmoid diverticulosis        Complication:  None      EBL: None     SPECIMENS:   ID Type Source Tests Collected by Time Destination   1 :  Preservative Stomach, Antrum  Yuliana Liu MD 2/15/2023 1000 Pathology             Asha Juarez MD  February 15, 2023  10:25 AM

## 2023-02-15 NOTE — INTERVAL H&P NOTE
Update History & Physical    The Patient's History and Physical of February 15, 2023 was reviewed with the patient and I examined the patient. There was no change. The surgical site was confirmed by the patient and me. Plan:  The risk, benefits, expected outcome, and alternative to the recommended procedure have been discussed with the patient. Patient understands and wants to proceed with the procedure.     Electronically signed by Alexis Perez MD on 2/15/2023 at 9:38 AM

## 2023-02-15 NOTE — OP NOTES
EGD Procedure Note        Patient: Raphael Good MRN: [id number]  SSN: [id number]    YOB: 1955  Age: 74 y.o. Sex: female        Date/Time:  2/15/2023 10:31 AM         IMPRESSION:       Generalized gastritis-most prominent in the antrum  Distal esophagitis (grade 2)  Duodenitis (bulb)       RECOMMENDATIONS:    Check biopsy results. Continue the pantoprazole 40 mg daily. Procedure: Esophagogastroduodenoscopy with cold biopsies    Indication: Gastroesophageal reflux disease, history of gastric polyp    Endoscopist:  Alexis Perez MD    Referring Provider:   Brina Shelton MD    History: The history and physical exam were reviewed and updated. Endoscope: GIF H190 Olympus video endoscope    Extent of Exam: second portion of the duodenum    ASA: ASA 2 - Patient with mild systemic disease with no functional limitations    Anethesia/Sedation:  TIVA    Description of the procedure: The procedure was discussed with the patient including risks, benefits, alternatives including risks of iv sedation, bleeding, perforation and aspiration. A safety timeout was performed. The patient was placed in the left lateral decubitus position. A bite block was placed. The patient was using standard protocol. The patients vital signs were monitored at all times including heart rate/rhythm, blood pressure and oxygen saturation. The endoscope was then passed under direct visualization to the second portion of the duodenum. The endoscope was then slowly withdrawn while visualizing the mucosa. In the stomach a retroflexion was performed and gastric fundus and cardia visualized. The patient was then transferred to recovery in stable condition. Findings:   Esophagus: The esophageal mucosa was inflamed in the distal esophagus at the GE junction consistent with a grade 2 esophagitis. .  Stomach:  The gastric mucosa was focally inflamed with severe areas of inflammation in the gastric antrum, with superficial ulcerations. Biopsies were taken in the antrum of this finding. And also had scattered areas of increased erythema and inflammation scattered throughout the remainder of the stomach. Duodenum: The duodenum mucosa was inflamed throughout the duodenal bulb. Therapies:  None    Specimens:   ID Type Source Tests Collected by Time Destination   1 :  Preservative Stomach, Antrum  Joyce Bustos MD 2/15/2023 1000 Pathology        Assistants:   Circ-1: Ryan Mendoza  Scrub Tech-1: Vangie Dominguez    EBL:Minimal    Complications:   None; patient tolerated the procedure well.      Implants: None    Discharge disposition:  Out of the recovery area when discharge criteria met         Florecita Ash MD  February 15, 2023  10:31 AM

## 2023-02-15 NOTE — ANESTHESIA POSTPROCEDURE EVALUATION
Procedure(s):  COLONOSCOPY AND EGD (ANES TIVA)  ESOPHAGOGASTRODUODENOSCOPY (EGD). total IV anesthesia    Anesthesia Post Evaluation      Multimodal analgesia: multimodal analgesia not used between 6 hours prior to anesthesia start to PACU discharge  Patient location during evaluation: PACU  Patient participation: complete - patient participated  Level of consciousness: sleepy but conscious  Pain management: adequate  Airway patency: patent  Anesthetic complications: no  Cardiovascular status: acceptable and stable  Respiratory status: acceptable and room air  Hydration status: acceptable  Post anesthesia nausea and vomiting:  none  Final Post Anesthesia Temperature Assessment:  Normothermia (36.0-37.5 degrees C)      INITIAL Post-op Vital signs: No vitals data found for the desired time range.

## 2023-02-20 NOTE — PROGRESS NOTES
Tell patient that the biopsies taken in her stomach showed gastritis/inflammation. No infection was noted. Continue pantoprazole 40 mg daily. Repeat colonoscopy in 10 years.

## 2023-02-23 NOTE — PROGRESS NOTES
Patient notified that the biopsies taken in her stomach showed gastritis/inflammation.  No infection was noted.  Continue pantoprazole 40 mg daily.  Repeat colonoscopy in 10 years.

## 2023-05-25 RX ORDER — AMLODIPINE BESYLATE 10 MG/1
TABLET ORAL
COMMUNITY
Start: 2021-02-22

## 2023-05-25 RX ORDER — PRAVASTATIN SODIUM 20 MG
TABLET ORAL
COMMUNITY
Start: 2022-12-01

## 2023-05-25 RX ORDER — HYDROCHLOROTHIAZIDE 25 MG/1
TABLET ORAL
COMMUNITY
Start: 2021-02-22

## 2023-05-25 RX ORDER — PANTOPRAZOLE SODIUM 40 MG/1
40 TABLET, DELAYED RELEASE ORAL DAILY
COMMUNITY
Start: 2023-01-17

## 2024-02-07 ENCOUNTER — OFFICE VISIT (OUTPATIENT)
Age: 69
End: 2024-02-07
Payer: MEDICARE

## 2024-02-07 VITALS
OXYGEN SATURATION: 99 % | HEART RATE: 74 BPM | WEIGHT: 172.2 LBS | BODY MASS INDEX: 32.51 KG/M2 | HEIGHT: 61 IN | RESPIRATION RATE: 14 BRPM | DIASTOLIC BLOOD PRESSURE: 80 MMHG | TEMPERATURE: 97.7 F | SYSTOLIC BLOOD PRESSURE: 130 MMHG

## 2024-02-07 DIAGNOSIS — R10.13 EPIGASTRIC PAIN: ICD-10-CM

## 2024-02-07 DIAGNOSIS — K21.00 GASTROESOPHAGEAL REFLUX DISEASE WITH ESOPHAGITIS WITHOUT HEMORRHAGE: ICD-10-CM

## 2024-02-07 DIAGNOSIS — H66.90 EAR INFECTION: Primary | ICD-10-CM

## 2024-02-07 DIAGNOSIS — R10.32 LEFT LOWER QUADRANT PAIN: ICD-10-CM

## 2024-02-07 DIAGNOSIS — K57.30 DIVERTICULAR DISEASE OF COLON: ICD-10-CM

## 2024-02-07 DIAGNOSIS — Z87.19 HISTORY OF GASTRIC POLYP: ICD-10-CM

## 2024-02-07 PROCEDURE — G8417 CALC BMI ABV UP PARAM F/U: HCPCS | Performed by: INTERNAL MEDICINE

## 2024-02-07 PROCEDURE — G8427 DOCREV CUR MEDS BY ELIG CLIN: HCPCS | Performed by: INTERNAL MEDICINE

## 2024-02-07 PROCEDURE — 3079F DIAST BP 80-89 MM HG: CPT | Performed by: INTERNAL MEDICINE

## 2024-02-07 PROCEDURE — 3017F COLORECTAL CA SCREEN DOC REV: CPT | Performed by: INTERNAL MEDICINE

## 2024-02-07 PROCEDURE — 99214 OFFICE O/P EST MOD 30 MIN: CPT | Performed by: INTERNAL MEDICINE

## 2024-02-07 PROCEDURE — 1036F TOBACCO NON-USER: CPT | Performed by: INTERNAL MEDICINE

## 2024-02-07 PROCEDURE — G8400 PT W/DXA NO RESULTS DOC: HCPCS | Performed by: INTERNAL MEDICINE

## 2024-02-07 PROCEDURE — 1090F PRES/ABSN URINE INCON ASSESS: CPT | Performed by: INTERNAL MEDICINE

## 2024-02-07 PROCEDURE — 3075F SYST BP GE 130 - 139MM HG: CPT | Performed by: INTERNAL MEDICINE

## 2024-02-07 PROCEDURE — G8484 FLU IMMUNIZE NO ADMIN: HCPCS | Performed by: INTERNAL MEDICINE

## 2024-02-07 PROCEDURE — 1123F ACP DISCUSS/DSCN MKR DOCD: CPT | Performed by: INTERNAL MEDICINE

## 2024-02-07 RX ORDER — CEPHALEXIN 250 MG/1
CAPSULE ORAL
COMMUNITY
Start: 2024-01-27

## 2024-02-07 ASSESSMENT — PATIENT HEALTH QUESTIONNAIRE - PHQ9
SUM OF ALL RESPONSES TO PHQ9 QUESTIONS 1 & 2: 0
SUM OF ALL RESPONSES TO PHQ QUESTIONS 1-9: 0
2. FEELING DOWN, DEPRESSED OR HOPELESS: 0
1. LITTLE INTEREST OR PLEASURE IN DOING THINGS: 0
SUM OF ALL RESPONSES TO PHQ QUESTIONS 1-9: 0

## 2024-02-07 NOTE — PROGRESS NOTES
1. Have you been to the ER, urgent care clinic since your last visit?  Hospitalized since your last visit? Yes 1-9-24 dizzy    2. Have you seen or consulted any other health care providers outside of the [facility] since your last visit?  Include any pap smears or colon screening.  No   Chief Complaint   Patient presents with    l sided diverticulitis     /80 (Site: Left Upper Arm, Position: Sitting, Cuff Size: Large Adult)   Pulse 74   Temp 97.7 °F (36.5 °C) (Temporal)   Resp 14   Ht 1.537 m (5' 0.5\")   Wt 78.1 kg (172 lb 3.2 oz)   SpO2 99% Comment: room air  BMI 33.08 kg/m²

## 2024-09-25 ENCOUNTER — OFFICE VISIT (OUTPATIENT)
Age: 69
End: 2024-09-25
Payer: MEDICARE

## 2024-09-25 VITALS
WEIGHT: 169.2 LBS | BODY MASS INDEX: 31.95 KG/M2 | SYSTOLIC BLOOD PRESSURE: 131 MMHG | HEART RATE: 55 BPM | RESPIRATION RATE: 16 BRPM | OXYGEN SATURATION: 98 % | HEIGHT: 61 IN | TEMPERATURE: 98.4 F | DIASTOLIC BLOOD PRESSURE: 83 MMHG

## 2024-09-25 DIAGNOSIS — Z87.19 HISTORY OF GASTRIC POLYP: ICD-10-CM

## 2024-09-25 DIAGNOSIS — R10.13 EPIGASTRIC PAIN: ICD-10-CM

## 2024-09-25 DIAGNOSIS — K21.00 GASTROESOPHAGEAL REFLUX DISEASE WITH ESOPHAGITIS WITHOUT HEMORRHAGE: Primary | ICD-10-CM

## 2024-09-25 PROCEDURE — 3079F DIAST BP 80-89 MM HG: CPT | Performed by: INTERNAL MEDICINE

## 2024-09-25 PROCEDURE — 3075F SYST BP GE 130 - 139MM HG: CPT | Performed by: INTERNAL MEDICINE

## 2024-09-25 PROCEDURE — 1036F TOBACCO NON-USER: CPT | Performed by: INTERNAL MEDICINE

## 2024-09-25 PROCEDURE — G8427 DOCREV CUR MEDS BY ELIG CLIN: HCPCS | Performed by: INTERNAL MEDICINE

## 2024-09-25 PROCEDURE — 1123F ACP DISCUSS/DSCN MKR DOCD: CPT | Performed by: INTERNAL MEDICINE

## 2024-09-25 PROCEDURE — G8417 CALC BMI ABV UP PARAM F/U: HCPCS | Performed by: INTERNAL MEDICINE

## 2024-09-25 PROCEDURE — 1090F PRES/ABSN URINE INCON ASSESS: CPT | Performed by: INTERNAL MEDICINE

## 2024-09-25 PROCEDURE — G8400 PT W/DXA NO RESULTS DOC: HCPCS | Performed by: INTERNAL MEDICINE

## 2024-09-25 PROCEDURE — 99213 OFFICE O/P EST LOW 20 MIN: CPT | Performed by: INTERNAL MEDICINE

## 2024-09-25 PROCEDURE — 3017F COLORECTAL CA SCREEN DOC REV: CPT | Performed by: INTERNAL MEDICINE

## 2024-10-01 ASSESSMENT — ENCOUNTER SYMPTOMS
BLOOD IN STOOL: 0
DIARRHEA: 0
ABDOMINAL DISTENTION: 0
CONSTIPATION: 0
VOMITING: 0
NAUSEA: 0
ABDOMINAL PAIN: 1
RESPIRATORY NEGATIVE: 1
RECTAL PAIN: 0
ANAL BLEEDING: 0
ALLERGIC/IMMUNOLOGIC NEGATIVE: 1

## 2024-10-02 NOTE — PROGRESS NOTES
Chief Complaint   Patient presents with    Follow-up     \"Have you been to the ER, urgent care clinic since your last visit?  Hospitalized since your last visit?\"    NO    “Have you seen or consulted any other health care providers outside our system since your last visit?”    NO    Have you had a mammogram?”   NO    No breast cancer screening on file         Click Here for Release of Records Request   
other systems reviewed and are negative.          /83 (Site: Left Upper Arm, Position: Sitting, Cuff Size: Medium Adult)   Pulse 55   Temp 98.4 °F (36.9 °C) (Oral)   Resp 16   Ht 1.549 m (5' 1\")   Wt 76.7 kg (169 lb 3.2 oz)   SpO2 98%   BMI 31.97 kg/m²     Physical Exam  Vitals and nursing note reviewed.   Constitutional:       Appearance: Normal appearance. She is obese.   HENT:      Head: Normocephalic and atraumatic.      Nose: Nose normal.   Eyes:      General: No scleral icterus.  Cardiovascular:      Rate and Rhythm: Normal rate and regular rhythm.      Pulses: Normal pulses.      Heart sounds: Normal heart sounds.   Pulmonary:      Effort: Pulmonary effort is normal.      Breath sounds: Normal breath sounds.   Abdominal:      General: Abdomen is flat. There is no distension.      Palpations: Abdomen is soft. There is no mass.      Tenderness: There is abdominal tenderness. There is no right CVA tenderness, left CVA tenderness, guarding or rebound.      Hernia: No hernia is present.   Musculoskeletal:      Right lower leg: No edema.      Left lower leg: No edema.   Skin:     General: Skin is warm and dry.   Neurological:      General: No focal deficit present.      Mental Status: She is alert and oriented to person, place, and time. Mental status is at baseline.   Psychiatric:         Mood and Affect: Mood normal.         Behavior: Behavior normal.         Thought Content: Thought content normal.         Judgment: Judgment normal.        1. Gastroesophageal reflux disease with esophagitis without hemorrhage  Continue the pantoprazole 40 mg daily.    2. Epigastric pain  Probably secondary to GERD.    3. History of gastric polyp

## 2025-01-29 ENCOUNTER — OFFICE VISIT (OUTPATIENT)
Age: 70
End: 2025-01-29
Payer: MEDICARE

## 2025-01-29 VITALS
SYSTOLIC BLOOD PRESSURE: 134 MMHG | HEART RATE: 54 BPM | TEMPERATURE: 97.5 F | HEIGHT: 61 IN | BODY MASS INDEX: 32.25 KG/M2 | DIASTOLIC BLOOD PRESSURE: 71 MMHG | RESPIRATION RATE: 16 BRPM | OXYGEN SATURATION: 97 % | WEIGHT: 170.8 LBS

## 2025-01-29 DIAGNOSIS — K21.00 GASTROESOPHAGEAL REFLUX DISEASE WITH ESOPHAGITIS WITHOUT HEMORRHAGE: Primary | ICD-10-CM

## 2025-01-29 DIAGNOSIS — K57.92 DIVERTICULITIS: ICD-10-CM

## 2025-01-29 DIAGNOSIS — Z87.19 HISTORY OF GASTRIC POLYP: ICD-10-CM

## 2025-01-29 PROCEDURE — 1123F ACP DISCUSS/DSCN MKR DOCD: CPT | Performed by: INTERNAL MEDICINE

## 2025-01-29 PROCEDURE — G8427 DOCREV CUR MEDS BY ELIG CLIN: HCPCS | Performed by: INTERNAL MEDICINE

## 2025-01-29 PROCEDURE — 1159F MED LIST DOCD IN RCRD: CPT | Performed by: INTERNAL MEDICINE

## 2025-01-29 PROCEDURE — 99214 OFFICE O/P EST MOD 30 MIN: CPT | Performed by: INTERNAL MEDICINE

## 2025-01-29 PROCEDURE — 3075F SYST BP GE 130 - 139MM HG: CPT | Performed by: INTERNAL MEDICINE

## 2025-01-29 PROCEDURE — 3078F DIAST BP <80 MM HG: CPT | Performed by: INTERNAL MEDICINE

## 2025-01-29 PROCEDURE — 1090F PRES/ABSN URINE INCON ASSESS: CPT | Performed by: INTERNAL MEDICINE

## 2025-01-29 PROCEDURE — G8400 PT W/DXA NO RESULTS DOC: HCPCS | Performed by: INTERNAL MEDICINE

## 2025-01-29 PROCEDURE — 1036F TOBACCO NON-USER: CPT | Performed by: INTERNAL MEDICINE

## 2025-01-29 PROCEDURE — 3017F COLORECTAL CA SCREEN DOC REV: CPT | Performed by: INTERNAL MEDICINE

## 2025-01-29 PROCEDURE — 1160F RVW MEDS BY RX/DR IN RCRD: CPT | Performed by: INTERNAL MEDICINE

## 2025-01-29 PROCEDURE — G8417 CALC BMI ABV UP PARAM F/U: HCPCS | Performed by: INTERNAL MEDICINE

## 2025-01-29 RX ORDER — PANTOPRAZOLE SODIUM 40 MG/1
40 TABLET, DELAYED RELEASE ORAL DAILY
Qty: 30 TABLET | Refills: 5 | Status: SHIPPED | OUTPATIENT
Start: 2025-01-29

## 2025-01-29 RX ORDER — CEPHALEXIN 500 MG/1
500 CAPSULE ORAL 2 TIMES DAILY
Qty: 10 CAPSULE | Refills: 0 | Status: SHIPPED | OUTPATIENT
Start: 2025-01-29

## 2025-01-29 NOTE — PROGRESS NOTES
Chief Complaint   Patient presents with    Follow-up     4 month fup     \"Have you been to the ER, urgent care clinic since your last visit?  Hospitalized since your last visit?\"    NO    “Have you seen or consulted any other health care providers outside our system since your last visit?”    NO    Have you had a mammogram?”   YES - Where November 2024    No breast cancer screening on file

## 2025-02-01 ASSESSMENT — ENCOUNTER SYMPTOMS
BLOOD IN STOOL: 0
ABDOMINAL PAIN: 1
ALLERGIC/IMMUNOLOGIC NEGATIVE: 1
DIARRHEA: 1
NAUSEA: 0
VOMITING: 0
CONSTIPATION: 0
RESPIRATORY NEGATIVE: 1
RECTAL PAIN: 0
ANAL BLEEDING: 0
ABDOMINAL DISTENTION: 0

## 2025-02-10 ENCOUNTER — TELEPHONE (OUTPATIENT)
Age: 70
End: 2025-02-10

## 2025-02-10 NOTE — TELEPHONE ENCOUNTER
Patient called states Cephalexin does not work. She will go to Dr Stephen and get Cipro because it works.

## 2025-07-29 ENCOUNTER — OFFICE VISIT (OUTPATIENT)
Age: 70
End: 2025-07-29
Payer: MEDICARE

## 2025-07-29 VITALS
DIASTOLIC BLOOD PRESSURE: 74 MMHG | TEMPERATURE: 98 F | HEIGHT: 61 IN | OXYGEN SATURATION: 98 % | RESPIRATION RATE: 20 BRPM | HEART RATE: 56 BPM | WEIGHT: 170.5 LBS | BODY MASS INDEX: 32.19 KG/M2 | SYSTOLIC BLOOD PRESSURE: 153 MMHG

## 2025-07-29 DIAGNOSIS — K21.00 GASTROESOPHAGEAL REFLUX DISEASE WITH ESOPHAGITIS WITHOUT HEMORRHAGE: ICD-10-CM

## 2025-07-29 DIAGNOSIS — R10.32 LLQ ABDOMINAL PAIN: Primary | ICD-10-CM

## 2025-07-29 DIAGNOSIS — K57.30 DIVERTICULAR DISEASE OF COLON: ICD-10-CM

## 2025-07-29 PROCEDURE — 99214 OFFICE O/P EST MOD 30 MIN: CPT | Performed by: INTERNAL MEDICINE

## 2025-07-29 PROCEDURE — 1126F AMNT PAIN NOTED NONE PRSNT: CPT | Performed by: INTERNAL MEDICINE

## 2025-07-29 PROCEDURE — G8417 CALC BMI ABV UP PARAM F/U: HCPCS | Performed by: INTERNAL MEDICINE

## 2025-07-29 PROCEDURE — G8427 DOCREV CUR MEDS BY ELIG CLIN: HCPCS | Performed by: INTERNAL MEDICINE

## 2025-07-29 PROCEDURE — 3077F SYST BP >= 140 MM HG: CPT | Performed by: INTERNAL MEDICINE

## 2025-07-29 PROCEDURE — 1123F ACP DISCUSS/DSCN MKR DOCD: CPT | Performed by: INTERNAL MEDICINE

## 2025-07-29 PROCEDURE — 3017F COLORECTAL CA SCREEN DOC REV: CPT | Performed by: INTERNAL MEDICINE

## 2025-07-29 PROCEDURE — 1036F TOBACCO NON-USER: CPT | Performed by: INTERNAL MEDICINE

## 2025-07-29 PROCEDURE — 3078F DIAST BP <80 MM HG: CPT | Performed by: INTERNAL MEDICINE

## 2025-07-29 PROCEDURE — G8400 PT W/DXA NO RESULTS DOC: HCPCS | Performed by: INTERNAL MEDICINE

## 2025-07-29 PROCEDURE — 1090F PRES/ABSN URINE INCON ASSESS: CPT | Performed by: INTERNAL MEDICINE

## 2025-07-29 RX ORDER — MECLIZINE HYDROCHLORIDE 25 MG/1
25 TABLET ORAL 2 TIMES DAILY PRN
COMMUNITY
Start: 2025-06-02

## 2025-07-29 RX ORDER — METRONIDAZOLE 500 MG/1
500 TABLET ORAL 3 TIMES DAILY
Qty: 42 TABLET | Refills: 0 | Status: SHIPPED | OUTPATIENT
Start: 2025-07-29

## 2025-08-09 ASSESSMENT — ENCOUNTER SYMPTOMS
ALLERGIC/IMMUNOLOGIC NEGATIVE: 1
NAUSEA: 0
ABDOMINAL DISTENTION: 0
CONSTIPATION: 0
BLOOD IN STOOL: 0
ABDOMINAL PAIN: 1
RESPIRATORY NEGATIVE: 1
RECTAL PAIN: 0
VOMITING: 0
ANAL BLEEDING: 0
DIARRHEA: 0

## (undated) DEVICE — TUBING, SUCTION, 9/32" X 10', STRAIGHT: Brand: MEDLINE

## (undated) DEVICE — WASH CLOTH INCONT LO LINT PREM 12X13 IN LF DISP

## (undated) DEVICE — FCPS RAD JAW 4 SC 240CM W/NDL --

## (undated) DEVICE — SOLUTION IRRIG 1000ML STRL H2O USP PLAS POUR BTL

## (undated) DEVICE — SOL IRR STRL H2O 1000ML BTL --

## (undated) DEVICE — 1200CC GUARDIAN II: Brand: GUARDIAN

## (undated) DEVICE — STERILE POLYISOPRENE POWDER-FREE SURGICAL GLOVES: Brand: PROTEXIS

## (undated) DEVICE — JELLY,LUBE,STERILE,FLIP TOP,TUBE,4-OZ: Brand: MEDLINE

## (undated) DEVICE — SPONGE GZ W4XL4IN COT 12 PLY TYP VII WVN C FLD DSGN STERILE

## (undated) DEVICE — PAD,PREPPING,CUFFED,24X48,7",NONSTERILE: Brand: MEDLINE

## (undated) DEVICE — MASK POM PROCEDURE OXY W/ HI CONCENTRATION CO2 MONITOR

## (undated) DEVICE — GLOVE ORANGE PI 7 1/2   MSG9075

## (undated) DEVICE — SPONGE GZ W4XL4IN COT 12 PLY TYP VII WVN C FLD DSGN